# Patient Record
Sex: FEMALE | Race: OTHER | URBAN - METROPOLITAN AREA
[De-identification: names, ages, dates, MRNs, and addresses within clinical notes are randomized per-mention and may not be internally consistent; named-entity substitution may affect disease eponyms.]

---

## 2022-06-01 ENCOUNTER — OFFICE VISIT (OUTPATIENT)
Dept: CARDIOLOGY CLINIC | Age: 57
End: 2022-06-01
Payer: COMMERCIAL

## 2022-06-01 VITALS
OXYGEN SATURATION: 98 % | HEART RATE: 104 BPM | SYSTOLIC BLOOD PRESSURE: 138 MMHG | HEIGHT: 60 IN | BODY MASS INDEX: 23.36 KG/M2 | WEIGHT: 119 LBS | DIASTOLIC BLOOD PRESSURE: 80 MMHG

## 2022-06-01 DIAGNOSIS — I10 HYPERTENSION, UNSPECIFIED TYPE: Primary | ICD-10-CM

## 2022-06-01 DIAGNOSIS — E03.9 HYPOTHYROIDISM, UNSPECIFIED TYPE: ICD-10-CM

## 2022-06-01 PROCEDURE — 93000 ELECTROCARDIOGRAM COMPLETE: CPT | Performed by: SPECIALIST

## 2022-06-01 PROCEDURE — 99204 OFFICE O/P NEW MOD 45 MIN: CPT | Performed by: SPECIALIST

## 2022-06-01 RX ORDER — LOSARTAN POTASSIUM 100 MG/1
100 TABLET ORAL DAILY
COMMUNITY
End: 2022-06-01 | Stop reason: SDUPTHER

## 2022-06-01 RX ORDER — LEVOTHYROXINE SODIUM 100 UG/1
75 TABLET ORAL
COMMUNITY

## 2022-06-01 RX ORDER — LOSARTAN POTASSIUM 100 MG/1
100 TABLET ORAL DAILY
Qty: 90 TABLET | Refills: 3 | Status: SHIPPED | OUTPATIENT
Start: 2022-06-01

## 2022-06-01 NOTE — PROGRESS NOTES
Malik Nunez MD. [facility]              Patient: Berkley Solomon  : 1965      Today's Date: 2022          HISTORY OF PRESENT ILLNESS:     History of Present Illness:  Referred for HTN    She has a long history of HTN. But past few weeks BP has been high. Was seen at [facility] recently. Was told to see Cardiology on discharge. Denies CP or SOB. Does not have PCP. PAST MEDICAL HISTORY:     Past Medical History:   Diagnosis Date    Arthritis     HTN (hypertension)     Hypothyroid              MEDICATIONS:     Current Outpatient Medications   Medication Sig Dispense Refill    losartan (COZAAR) 100 mg tablet Take 100 mg by mouth daily.  levothyroxine (SYNTHROID) 100 mcg tablet Take 75 mcg by mouth Daily (before breakfast). No Known Allergies          SOCIAL HISTORY:     Social History     Tobacco Use    Smoking status: Never Smoker    Smokeless tobacco: Not on file   Substance Use Topics    Alcohol use: Not Currently    Drug use: Not on file         FAMILY HISTORY:     Family History   Problem Relation Age of Onset    No Known Problems Mother     No Known Problems Father                REVIEW OF SYMPTOMS:     Review of Symptoms:  Constitutional: Negative for fever, chills  HEENT: Negative for nosebleeds, tinnitus, and vision changes. Respiratory: Negative for cough, wheezing  Cardiovascular: Negative for orthopnea, claudication, syncope, and PND. Gastrointestinal: Negative for abdominal pain, diarrhea, melena. Genitourinary: Negative for dysuria  Musculoskeletal: Negative for myalgias. Skin: Negative for rash  Heme: No problems bleeding. Neurological: Negative for speech change and focal weakness.          PHYSICAL EXAM:     Physical Exam:  Visit Vitals  /80 (BP 1 Location: Left upper arm, BP Patient Position: Sitting)   Pulse (!) 104   Ht 5' (1.524 m)   Wt 119 lb (54 kg)   SpO2 98%   BMI 23.24 kg/m²     Patient appears generally well, mood and affect are appropriate and pleasant. HEENT:  Hearing intact, non-icteric, normocephalic, atraumatic. Neck Exam: Supple, No JVD or carotid bruits. Lung Exam: Clear to auscultation, even breath sounds. Cardiac Exam: Regular rate and rhythm with no murmur or rub  Abdomen: Soft, non-tender, normal bowel sounds. No bruits or masses. Extremities: Moves all ext well. No lower extremity edema. MSKTL: Overall good ROM ext  Skin: No significant rashes  Psych: Appropriate affect  Neuro - Grossly intact      LABS / OTHER STUDIES reviewed: Will get [facility] records to review. CARDIAC DIAGNOSTICS:     Cardiac Evaluation Includes:  I reviewed the results below. EKG 6/1/22 - NSR, normal       ASSESSMENT AND PLAN:     Assessment and Plan:    1) HTN  - BP OK today   - Asked her to keep track of BP at home  - cont losartan     2) Check lipids     3) Thyroid disease  - cont synthroid   - she is to establish with a PCP    4) See Jose Lopez in 3 months     Moved from [place] in 2021. Likes with daughter. From the [facility]. Eric Andersen MD, [address], [address] [place] [address]  Ph: [phone]   Ph [phone]      [facility]   6/7/2022  [facility] records reviewed. Presented to ED 5/11/22 with weakness. SBP was 105 at home --->  in ED.     EKG 5/11/22 - NSR  Labs 5/11/22 - CBC and CMP OK

## 2022-06-01 NOTE — PROGRESS NOTES
Chief Complaint   Patient presents with    New Patient    Hypertension     Visit Vitals  /80 (BP 1 Location: Left upper arm, BP Patient Position: Sitting)   Pulse (!) 104   Ht 5' (1.524 m)   Wt 119 lb (54 kg)   SpO2 98%   BMI 23.24 kg/m²     Chest pain denied   SOB sometimes at night.    Palpitations denied   Swelling in hands/feet denied   Dizziness denied   Recent hospital stays denied   Refills denied

## 2022-06-03 ENCOUNTER — TELEPHONE (OUTPATIENT)
Dept: CARDIOLOGY CLINIC | Age: 57
End: 2022-06-03

## 2022-06-03 NOTE — TELEPHONE ENCOUNTER
Received [facility] records. Patient was seen once by Dr. Marianela Block on 6-1-2022. Pending appointment with Jose Lopez NP in September.

## 2022-09-12 RX ORDER — LOSARTAN POTASSIUM 100 MG/1
100 TABLET ORAL DAILY
Qty: 90 TABLET | Refills: 3 | OUTPATIENT
Start: 2022-09-12

## 2022-09-12 NOTE — TELEPHONE ENCOUNTER
Refill per VO of Dr. Nannette Abad  Last appt: Visit date not found  Future Appointments   Date Time Provider Johnny Rios   9/13/2022  2:40 PM Aydee Kemp, NP [facility]       Requested Prescriptions     Refused Prescriptions Disp Refills    losartan (COZAAR) 100 mg tablet 90 Tablet 3     Sig: Take 1 Tablet by mouth daily.      Refused By: Elsie Ny     Reason for Refusal: Patient has requested refill too soon

## 2022-10-25 ENCOUNTER — OFFICE VISIT (OUTPATIENT)
Dept: CARDIOLOGY CLINIC | Age: 57
End: 2022-10-25
Payer: COMMERCIAL

## 2022-10-25 VITALS
HEART RATE: 105 BPM | SYSTOLIC BLOOD PRESSURE: 130 MMHG | HEIGHT: 60 IN | WEIGHT: 120 LBS | DIASTOLIC BLOOD PRESSURE: 70 MMHG | BODY MASS INDEX: 23.56 KG/M2 | OXYGEN SATURATION: 97 %

## 2022-10-25 DIAGNOSIS — I10 HYPERTENSION, UNSPECIFIED TYPE: Primary | ICD-10-CM

## 2022-10-25 DIAGNOSIS — E03.9 HYPOTHYROIDISM, UNSPECIFIED TYPE: ICD-10-CM

## 2022-10-25 PROCEDURE — 99214 OFFICE O/P EST MOD 30 MIN: CPT | Performed by: SPECIALIST

## 2022-10-25 RX ORDER — LOSARTAN POTASSIUM AND HYDROCHLOROTHIAZIDE 12.5; 1 MG/1; MG/1
1 TABLET ORAL DAILY
COMMUNITY
Start: 2022-10-19 | End: 2022-10-25

## 2022-10-25 NOTE — PROGRESS NOTES
Chief Complaint   Patient presents with    Follow-up     5 mo     Hypertension     Visit Vitals  /70 (BP 1 Location: Left upper arm, BP Patient Position: Sitting)   Pulse (!) 105   Ht 5' (1.524 m)   Wt 120 lb (54.4 kg)   SpO2 97%   BMI 23.44 kg/m²     Chest pain denied   SOB denied   Palpitations denied   Swelling in hands/feet denied   Dizziness denied   Recent hospital stays denied   Refills denied   10/19 went to ER - [facility], for HTN and started Losartan-Hctz, but later stopped, because pt states that was causing very low BP and was feeling not well.

## 2022-10-25 NOTE — PROGRESS NOTES
Rosibel Lagos MD. [facility]              Patient: Mauro Kocher  : 1965      Today's Date: 10/25/2022          HISTORY OF PRESENT ILLNESS:     History of Present Illness:  BP up and down and went to ED a few days ago. -150's at home - went to ED since  and started on losartan - HCTZ--- on that SBP ~ 100 so she stopped losartan-HCTZ. PAST MEDICAL HISTORY:     Past Medical History:   Diagnosis Date    Arthritis     HTN (hypertension)     Hypothyroid              MEDICATIONS:     Current Outpatient Medications   Medication Sig Dispense Refill    levothyroxine (SYNTHROID) 100 mcg tablet Take 75 mcg by mouth Daily (before breakfast). losartan (COZAAR) 100 mg tablet Take 1 Tablet by mouth daily. 90 Tablet 3       No Known Allergies          SOCIAL HISTORY:     Social History     Tobacco Use    Smoking status: Never    Smokeless tobacco: Never   Substance Use Topics    Alcohol use: Not Currently    Drug use: Never         FAMILY HISTORY:     Family History   Problem Relation Age of Onset    No Known Problems Mother     No Known Problems Father                REVIEW OF SYMPTOMS:     Review of Symptoms:  Constitutional: Negative for fever, chills  HEENT: Negative for nosebleeds, tinnitus, and vision changes. Respiratory: Negative for cough, wheezing  Cardiovascular: Negative for orthopnea, claudication, syncope, and PND. Gastrointestinal: Negative for abdominal pain, diarrhea, melena. Genitourinary: Negative for dysuria  Musculoskeletal: Negative for myalgias. Skin: Negative for rash  Heme: No problems bleeding. Neurological: Negative for speech change and focal weakness.          PHYSICAL EXAM:     Physical Exam:  Visit Vitals  /70 (BP 1 Location: Left upper arm, BP Patient Position: Sitting)   Pulse (!) 105   Ht 5' (1.524 m)   Wt 120 lb (54.4 kg)   SpO2 97%   BMI 23.44 kg/m²     Patient appears generally well, mood and affect are appropriate and pleasant. HEENT:  Hearing intact, non-icteric, normocephalic, atraumatic. Neck Exam: Supple, No JVD   Lung Exam: Clear to auscultation, even breath sounds. Cardiac Exam: Regular rate and rhythm with no murmur or rub  Abdomen: Soft, non-tender  Extremities: Moves all ext well. No lower extremity edema. MSKTL: Overall good ROM ext  Skin: No significant rashes  Psych: Appropriate affect  Neuro - Grossly intact      LABS / OTHER STUDIES reviewed:     [facility] records reviewed. Presented to ED 5/11/22 with weakness. SBP was 105 at home --->  in ED. EKG 5/11/22 - NSR  Labs 5/11/22 - CBC and CMP OK         CARDIAC DIAGNOSTICS:     Cardiac Evaluation Includes:  I reviewed the results below. EKG 6/1/22 - NSR, normal       ASSESSMENT AND PLAN:     Assessment and Plan:    1) HTN  - will get [facility] records to review   - cont losartan ---> BP was higher before, but seems OK now ---> will continue current regimen and reassess in a few weeks     2) Check lipids     3) Thyroid disease  - cont synthroid   - she is to establish with a PCP    4) See me in 2 months     Moved from [facility] in 2021. Likes with daughter. From the [facility]. Hipolito Maloney MD, [address], [address]  [place] [address] [address] [facility], [address] [place] [address], [address] [place] [address]  Ph: [phone]   Ph [phone]      [facility]   11/1/2022  [facility] records reviewed. Presented with HTN on 10/19/22.   Started on losartan -HCTZ    EKG 10/19/22 - NSR, NSST changes     Labs 10/19/22 - BMP and CBC OK

## 2023-01-04 ENCOUNTER — OFFICE VISIT (OUTPATIENT)
Dept: CARDIOLOGY CLINIC | Age: 58
End: 2023-01-04
Payer: COMMERCIAL

## 2023-01-04 VITALS
SYSTOLIC BLOOD PRESSURE: 112 MMHG | WEIGHT: 111 LBS | BODY MASS INDEX: 21.79 KG/M2 | HEIGHT: 60 IN | OXYGEN SATURATION: 98 % | HEART RATE: 102 BPM | DIASTOLIC BLOOD PRESSURE: 60 MMHG

## 2023-01-04 DIAGNOSIS — I10 PRIMARY HYPERTENSION: ICD-10-CM

## 2023-01-04 DIAGNOSIS — E78.5 DYSLIPIDEMIA: Primary | ICD-10-CM

## 2023-01-04 RX ORDER — ROSUVASTATIN CALCIUM 5 MG/1
5 TABLET, COATED ORAL DAILY
Qty: 90 TABLET | Refills: 3 | Status: SHIPPED | OUTPATIENT
Start: 2023-01-04

## 2023-01-04 NOTE — PROGRESS NOTES
Chief Complaint   Patient presents with    Follow-up     2 months    Hypertension       Vitals:    01/04/23 1450   BP: 112/60   Pulse: (!) 102   Height: 5' (1.524 m)   Weight: 111 lb (50.3 kg)   SpO2: 98%         Chest pain: no    SOB: no    Palpitations: no    Dizziness: no    Swelling: no    Refills:       Have you been to the ER, urgent care clinic since your last visit? Hospitalized since your last visit? Have you seen or consulted other health care providers outside of the [facility] system since your last visit?  (Include any pap smears or colon screening.)

## 2023-01-04 NOTE — PROGRESS NOTES
Adam Rodriguez MD. [facility]              Patient: Osmar Padilla  : 1965      Today's Date: 2023          HISTORY OF PRESENT ILLNESS:     History of Present Illness:  Doing OK lately - no cardiac complaints. No CP, SOB, dizziness. BP has been good - 120-130/60's             PAST MEDICAL HISTORY:     Past Medical History:   Diagnosis Date    Arthritis     HTN (hypertension)     Hypothyroid              MEDICATIONS:     Current Outpatient Medications   Medication Sig Dispense Refill    levothyroxine (SYNTHROID) 100 mcg tablet Take 75 mcg by mouth Daily (before breakfast). losartan (COZAAR) 100 mg tablet Take 1 Tablet by mouth daily. 90 Tablet 3       No Known Allergies          SOCIAL HISTORY:     Social History     Tobacco Use    Smoking status: Never    Smokeless tobacco: Never   Substance Use Topics    Alcohol use: Not Currently    Drug use: Never         FAMILY HISTORY:     Family History   Problem Relation Age of Onset    No Known Problems Mother     Hypertension Father                REVIEW OF SYMPTOMS:     Review of Symptoms:  Constitutional: Negative for fever, chills  HEENT: Negative for nosebleeds, tinnitus, and vision changes. Respiratory: Negative for cough, wheezing  Cardiovascular: Negative for orthopnea, claudication, syncope, and PND. Gastrointestinal: Negative for abdominal pain, diarrhea, melena. Genitourinary: Negative for dysuria  Musculoskeletal: Negative for myalgias. Skin: Negative for rash  Heme: No problems bleeding. Neurological: Negative for speech change and focal weakness. PHYSICAL EXAM:     Physical Exam:  Visit Vitals  /60   Pulse (!) 102   Ht 5' (1.524 m)   Wt 111 lb (50.3 kg)   SpO2 98%   BMI 21.68 kg/m²     Patient appears generally well, mood and affect are appropriate and pleasant. HEENT:  Hearing intact, non-icteric, normocephalic, atraumatic. Neck Exam: Supple, No JVD   Lung Exam: Clear to auscultation, even breath sounds. Cardiac Exam: Regular rate and rhythm with no murmur or rub  Abdomen: Soft, non-tender  Extremities: Moves all ext well. No lower extremity edema. MSKTL: Overall good ROM ext  Skin: No significant rashes  Psych: Appropriate affect  Neuro - Grossly intact      LABS / OTHER STUDIES reviewed:     [facility] records reviewed. Presented to ED 5/11/22 with weakness. SBP was 105 at home --->  in ED. EKG 5/11/22 - NSR  Labs 5/11/22 - CBC and CMP OK   Labs 12/19/22 - BMP OK, A1c 5.3, chol 196, , HDL 40, , LFT's OK         CARDIAC DIAGNOSTICS:     Cardiac Evaluation Includes:  I reviewed the results below. EKG 6/1/22 - NSR, normal       ASSESSMENT AND PLAN:     Assessment and Plan:    1) HTN  - BP OK --> cont losartan     2) Mild Dyslipidemia   - ASCVD Risk score is >9%  - work on diet / exercise   - I did recommend a statin - reviewed risks / benefits - she agrees to start crestor 5 mg daily --> FU labs in a few months     3) Thyroid disease  - cont synthroid   - she is to establish with a PCP    4) See me in 1 year     Moved from [place] in 2021. Likes with daughter. From the [facility]. Verta Holstein, MD, [facility]  [address] [facility], [address]  [place] [address]  [address]  [address]  Ph: [phone]   Ph [phone]

## 2023-06-28 RX ORDER — LOSARTAN POTASSIUM 100 MG/1
TABLET ORAL
Qty: 90 TABLET | Refills: 1 | Status: SHIPPED | OUTPATIENT
Start: 2023-06-28

## 2023-11-10 ENCOUNTER — TELEPHONE (OUTPATIENT)
Age: 58
End: 2023-11-10

## 2023-11-10 RX ORDER — LOSARTAN POTASSIUM 100 MG/1
TABLET ORAL
Qty: 90 TABLET | Refills: 1 | Status: SHIPPED | OUTPATIENT
Start: 2023-11-10

## 2023-11-10 NOTE — TELEPHONE ENCOUNTER
Refill per VO of Dr. Jennifer Orozco  Last appt: 1/4/2023    NV needed x1 yr    Requested Prescriptions     Pending Prescriptions Disp Refills    losartan (COZAAR) 100 MG tablet [Pharmacy Med Name: LOSARTAN POTASSIUM 100 MG TAB] 90 tablet 1     Sig: TAKE 1 TABLET BY MOUTH EVERY DAY